# Patient Record
(demographics unavailable — no encounter records)

---

## 2017-02-21 NOTE — REP
Clinical:  dysphagia.

 

Technique:  Single contrast and double contrast technique using barium sulfate

substrates.

 

Findings:

Surgical clips compatible with prior thyroid surgery.

Normal motility through the oropharynx and hypopharynx is appreciated without

obvious mass/mass effect or contour abnormality.  The esophagus demonstrates

normal mucosal outline and distension without ulcerations, polyps, mass lesions,

mucosal irregularities or extrinsic abnormalities.  No areas of stenosis or

stricture appreciated.  No hiatal hernia or evidence for reflux during

examination.  Limited evaluation of the stomach demonstrates normal gastric rugal

folds.

 

Total fluoroscopic time:  1 minute 40 seconds .

 

Impression:

Normal esophagram/barium swallow study.

 

 

Signed by

Ambrose Maciel MD 02/21/2017 09:47 A

## 2017-03-09 NOTE — REP
CT NECK WITH CONTRAST:

 

HISTORY:  Thyroid cancer.

 

CONTRAST:  Isovue 370, 75 mL.

 

COMPARISON:  11/04/2010.

 

The naso-, glenn-, and hypopharynx, larynx and subglottic trachea are normal in

appearance.  The patient is status post right submandibular sialadenectomy.  The

parotid and left submandibular glands are normal in appearance.  The patient is

status post thyroidectomy.  Surgical clips are present in the thyroid bed.  Small

lymph nodes less than 1 cm in size are present in the left internal jugular

chain, posterior triangles, submandibular and submental areas.  The lung apices

are clear.  Mucosal thickening is present in the ethmoid, maxillary and sphenoid

sinuses.

 

IMPRESSION:

 

1.  The patient is status post right submandibular sialadenectomy and total

thyroidectomy.

 

2.  There is no neck mass or adenopathy.

 

 

Signed by

Tommy Nava MD 03/09/2017 08:53 A

## 2017-04-13 NOTE — ROOR
________________________________________________________________________________

Patient Name: Leila Wiley             Procedure Date: 4/13/2017 12:04 PM

MRN: [id number]                          Account Number: [id number]

YOB: 1973               Age: 43

Room: [facility]                            Gender: Female

Note Status: Finalized                 

________________________________________________________________________________

 

Procedure:           Upper GI endoscopy

Indications:         Dysphagia, Suspected esophageal reflux, Globus sensation

Providers:           Jose PERRY MD

Referring MD:        Hernan Spencer NP

Requesting Provider: 

Medicines:           Monitored Anesthesia Care

Complications:       No immediate complications.

________________________________________________________________________________

Procedure:           Pre-Anesthesia Assessment:

                     - The heart rate, respiratory rate, oxygen saturations, 

                     blood pressure, adequacy of pulmonary ventilation, and 

                     response to care were monitored throughout the procedure.

                     The Endoscope was introduced through the mouth, and 

                     advanced to the second part of duodenum. The upper GI 

                     endoscopy was accomplished without difficulty. The 

                     patient tolerated the procedure well.

                                                                                

Findings:

     One mild benign-appearing, intrinsic stenosis was found in the lower 

     third of the esophagus. This measured less than one cm (in length) and 

     was traversed. A TTS dilator was passed through the scope. Dilation with 

     an 18-19-20 mm x 8 cm CRE balloon dilator was performed to 19 mm. The 

     dilation site was examined and showed complete resolution of luminal 

     narrowing.

     A small area of extrinsic compression was found at the cricopharyngeus. 

     The scope was withdrawn. Dilation was performed with a Garcia dilator 

     with no resistance at 54 Fr. The dilation site was examined and showed no 

     change.

     The exam of the esophagus was otherwise normal.

     Several biopsies were obtained in the upper third of the esophagus, in 

     the middle third of the esophagus and in the lower third of the esophagus 

     with cold forceps for evaluation of eosinophilic esophagitis.

     Very small (insignificant) Hiatal Hernia.

     The entire examined stomach was normal.

     The examined duodenum was normal.

                                                                                

Impression:          - Mild Schatzki ring/benign-appearing esophageal stenosis 

                     at GE junction. Dilated.

                     - Mild extrinsic compression at the cricopharyngeus. 

                     Dilated.

                     - Very small (insignificant) Hiatal Hernia.

                     - Normal stomach.

                     - Normal examined duodenum.

                     - Several biopsies were obtained in the upper third of 

                     the esophagus, in the middle third of the esophagus and 

                     in the lower third of the esophagus.

Recommendation:      - Continue present medications.

                     - Observe patient's clinical course.

                     - I anticipate no further need for intervention.

                     - Telephone endoscopist for pathology results in 2 weeks.

                                                                                

 

Jose Perry MD

________________

Jose PERRY MD

4/13/2017 12:25:05 PM

This report has been signed electronically.

Number of Addenda: 0

 

Note Initiated On: 4/13/2017 12:04 PM

Estimated Blood Loss:

     Estimated blood loss: none.

## 2017-04-25 NOTE — ECGEPIP
Stationary ECG Study

                           [place] - ED

                                       

                                       Test Date:    2017

Pat Name:     DIANE CARPIO             Department:   

Patient ID:   [id number]                 Room:         -

Gender:       F                        Technician:   ar

:          1973               Requested By: OLRENA HERNANDEZ

Order Number: [id number]     Reading MD:   Mitch Farr

                                 Measurements

Intervals                              Axis          

Rate:         69                       P:            50

FL:           139                      QRS:          34

QRSD:         98                       T:            44

QT:           381                                    

QTc:          411                                    

                           Interpretive Statements

SINUS RHYTHM

POSSIBLE LAE

INCOMPLETE RIGHT BUNDLE BRANCH BLOCK

NO PRIORS

Electronically Signed On 2017 18:07:07 EDT by Mitch Farr

## 2017-04-25 NOTE — REP
CT CHEST WITHOUT CONTRAST:

 

HISTORY: Trauma.

 

The examination was ordered and performed without intravenous contrast. The

trauma protocol was designed to optimize the possibility of diagnosing vascular

and solid organ injuries. The lack of intravenous contrast significantly

decreases the sensitivity of this exam.

 

COMPARISON: 10/05/2010

 

There is no gross mediastinal or hilar adenopathy. There are surgical clips in

the neck consistent with previous total thyroidectomy. There are no pleural or

pericardial effusions. Limited evaluation of the imaged upper abdomen shows no

gross abnormalities.

 

Bone window technique throughout the exam shows no evidence of an acute fracture.

Sternal wires are in placed from previous median sternotomy.

 

Evaluation of the lung fields shows no abnormal nodules, masses or opacities.

 

IMPRESSION:

 

CT limitations and findings as described above. There is no evidence of acute

disease.

 

 

Signed by

Sim Null DO 04/25/2017 03:02 P